# Patient Record
Sex: MALE | Race: OTHER | HISPANIC OR LATINO | Employment: FULL TIME | URBAN - METROPOLITAN AREA
[De-identification: names, ages, dates, MRNs, and addresses within clinical notes are randomized per-mention and may not be internally consistent; named-entity substitution may affect disease eponyms.]

---

## 2020-09-12 ENCOUNTER — APPOINTMENT (EMERGENCY)
Dept: RADIOLOGY | Facility: HOSPITAL | Age: 60
End: 2020-09-12
Payer: COMMERCIAL

## 2020-09-12 ENCOUNTER — HOSPITAL ENCOUNTER (OUTPATIENT)
Facility: HOSPITAL | Age: 60
Setting detail: OBSERVATION
Discharge: HOME | End: 2020-09-13
Attending: EMERGENCY MEDICINE | Admitting: INTERNAL MEDICINE
Payer: COMMERCIAL

## 2020-09-12 DIAGNOSIS — D64.9 ANEMIA: Primary | ICD-10-CM

## 2020-09-12 DIAGNOSIS — R77.8 ELEVATED TROPONIN: ICD-10-CM

## 2020-09-12 DIAGNOSIS — N30.01 ACUTE CYSTITIS WITH HEMATURIA: ICD-10-CM

## 2020-09-12 DIAGNOSIS — E87.1 HYPONATREMIA: ICD-10-CM

## 2020-09-12 DIAGNOSIS — E86.0 DEHYDRATION: ICD-10-CM

## 2020-09-12 DIAGNOSIS — I10 ESSENTIAL HYPERTENSION: ICD-10-CM

## 2020-09-12 PROCEDURE — 71045 X-RAY EXAM CHEST 1 VIEW: CPT

## 2020-09-12 PROCEDURE — 36415 COLL VENOUS BLD VENIPUNCTURE: CPT | Performed by: PHYSICIAN ASSISTANT

## 2020-09-12 PROCEDURE — 87651 STREP A DNA AMP PROBE: CPT | Performed by: PHYSICIAN ASSISTANT

## 2020-09-12 PROCEDURE — 87635 SARS-COV-2 COVID-19 AMP PRB: CPT | Performed by: INTERNAL MEDICINE

## 2020-09-12 PROCEDURE — 83690 ASSAY OF LIPASE: CPT | Performed by: PHYSICIAN ASSISTANT

## 2020-09-12 PROCEDURE — 99285 EMERGENCY DEPT VISIT HI MDM: CPT | Performed by: PHYSICIAN ASSISTANT

## 2020-09-12 PROCEDURE — 96360 HYDRATION IV INFUSION INIT: CPT

## 2020-09-12 PROCEDURE — 80053 COMPREHEN METABOLIC PANEL: CPT | Performed by: PHYSICIAN ASSISTANT

## 2020-09-12 PROCEDURE — 93005 ELECTROCARDIOGRAM TRACING: CPT

## 2020-09-12 PROCEDURE — 84484 ASSAY OF TROPONIN QUANT: CPT | Performed by: PHYSICIAN ASSISTANT

## 2020-09-12 PROCEDURE — 85025 COMPLETE CBC W/AUTO DIFF WBC: CPT | Performed by: PHYSICIAN ASSISTANT

## 2020-09-12 PROCEDURE — 99285 EMERGENCY DEPT VISIT HI MDM: CPT

## 2020-09-12 RX ADMIN — SODIUM CHLORIDE 1000 ML: 0.9 INJECTION, SOLUTION INTRAVENOUS at 23:50

## 2020-09-12 NOTE — LETTER
[address] [facility]  [address] [place] [id number] [phone]  Dept: [phone]    September 13, 2020     Patient: Kellen Schneider   YOB: 1960   Date of Visit: 9/12/2020       To Whom it May Concern:    Kellen Schneider is under my professional care  He was seen in the hospital from 9/12/2020   to 09/13/20  He may return to work on Sunday, September 20, 2020 without limitations  If you have any questions or concerns, please don't hesitate to call           Sincerely,          Gonzalez Dockery PA-C

## 2020-09-13 VITALS
TEMPERATURE: 98.9 F | SYSTOLIC BLOOD PRESSURE: 165 MMHG | HEART RATE: 86 BPM | RESPIRATION RATE: 20 BRPM | OXYGEN SATURATION: 99 % | DIASTOLIC BLOOD PRESSURE: 90 MMHG | BODY MASS INDEX: 42.06 KG/M2 | HEIGHT: 69 IN | WEIGHT: 283.95 LBS

## 2020-09-13 PROBLEM — I10 ESSENTIAL HYPERTENSION: Status: ACTIVE | Noted: 2020-09-13

## 2020-09-13 PROBLEM — E66.01 MORBID OBESITY WITH BMI OF 40.0-44.9, ADULT (HCC): Status: ACTIVE | Noted: 2020-09-13

## 2020-09-13 PROBLEM — R74.01 TRANSAMINITIS: Status: ACTIVE | Noted: 2020-09-13

## 2020-09-13 PROBLEM — R77.8 ELEVATED TROPONIN: Status: RESOLVED | Noted: 2020-09-13 | Resolved: 2020-09-13

## 2020-09-13 PROBLEM — E87.1 HYPONATREMIA: Status: ACTIVE | Noted: 2020-09-13

## 2020-09-13 PROBLEM — R77.8 ELEVATED TROPONIN: Status: ACTIVE | Noted: 2020-09-13

## 2020-09-13 PROBLEM — R06.83 SNORING: Status: ACTIVE | Noted: 2020-09-13

## 2020-09-13 PROBLEM — J02.9 ACUTE PHARYNGITIS, UNSPECIFIED: Status: RESOLVED | Noted: 2020-09-13 | Resolved: 2020-09-13

## 2020-09-13 PROBLEM — R73.9 HYPERGLYCEMIA: Status: ACTIVE | Noted: 2020-09-13

## 2020-09-13 PROBLEM — N17.9 ACUTE RENAL FAILURE (ARF) (HCC): Status: ACTIVE | Noted: 2020-09-13

## 2020-09-13 PROBLEM — N17.9 ACUTE RENAL FAILURE (ARF) (HCC): Status: RESOLVED | Noted: 2020-09-13 | Resolved: 2020-09-13

## 2020-09-13 PROBLEM — J02.9 ACUTE PHARYNGITIS, UNSPECIFIED: Status: ACTIVE | Noted: 2020-09-13

## 2020-09-13 PROBLEM — R31.29 MICROSCOPIC HEMATURIA: Status: ACTIVE | Noted: 2020-09-13

## 2020-09-13 PROBLEM — E11.9 NEWLY DIAGNOSED DIABETES (HCC): Status: ACTIVE | Noted: 2020-09-13

## 2020-09-13 LAB
ALBUMIN SERPL BCP-MCNC: 3.2 G/DL (ref 3–5.2)
ALBUMIN SERPL BCP-MCNC: 3.6 G/DL (ref 3–5.2)
ALP SERPL-CCNC: 52 U/L (ref 43–122)
ALP SERPL-CCNC: 54 U/L (ref 43–122)
ALT SERPL W P-5'-P-CCNC: 58 U/L (ref 9–52)
ALT SERPL W P-5'-P-CCNC: 63 U/L (ref 9–52)
ANION GAP SERPL CALCULATED.3IONS-SCNC: 6 MMOL/L (ref 5–14)
ANION GAP SERPL CALCULATED.3IONS-SCNC: 6 MMOL/L (ref 5–14)
ANION GAP SERPL CALCULATED.3IONS-SCNC: 7 MMOL/L (ref 5–14)
AST SERPL W P-5'-P-CCNC: 88 U/L (ref 17–59)
AST SERPL W P-5'-P-CCNC: 89 U/L (ref 17–59)
ATRIAL RATE: 87 BPM
BACTERIA UR QL AUTO: ABNORMAL /HPF
BASOPHILS # BLD AUTO: 0 THOUSANDS/ΜL (ref 0–0.1)
BASOPHILS NFR BLD AUTO: 0 % (ref 0–1)
BILIRUB SERPL-MCNC: 0.4 MG/DL
BILIRUB SERPL-MCNC: 0.4 MG/DL
BILIRUB UR QL STRIP: ABNORMAL
BUN SERPL-MCNC: 24 MG/DL (ref 5–25)
BUN SERPL-MCNC: 30 MG/DL (ref 5–25)
BUN SERPL-MCNC: 30 MG/DL (ref 5–25)
CALCIUM SERPL-MCNC: 8.3 MG/DL (ref 8.4–10.2)
CALCIUM SERPL-MCNC: 8.4 MG/DL (ref 8.4–10.2)
CALCIUM SERPL-MCNC: 8.8 MG/DL (ref 8.4–10.2)
CHLORIDE SERPL-SCNC: 94 MMOL/L (ref 97–108)
CHLORIDE SERPL-SCNC: 97 MMOL/L (ref 97–108)
CHLORIDE SERPL-SCNC: 97 MMOL/L (ref 97–108)
CK MB SERPL-MCNC: 6.5 NG/ML (ref 0–2.4)
CK MB SERPL-MCNC: <1 % (ref 0–2.5)
CK SERPL-CCNC: 803 U/L (ref 55–170)
CLARITY UR: ABNORMAL
CO2 SERPL-SCNC: 28 MMOL/L (ref 22–30)
CO2 SERPL-SCNC: 28 MMOL/L (ref 22–30)
CO2 SERPL-SCNC: 33 MMOL/L (ref 22–30)
COLOR UR: ABNORMAL
CREAT SERPL-MCNC: 1.09 MG/DL (ref 0.7–1.5)
CREAT SERPL-MCNC: 1.45 MG/DL (ref 0.7–1.5)
CREAT SERPL-MCNC: 2.17 MG/DL (ref 0.7–1.5)
EOSINOPHIL # BLD AUTO: 0 THOUSAND/ΜL (ref 0–0.4)
EOSINOPHIL NFR BLD AUTO: 0 % (ref 0–6)
ERYTHROCYTE [DISTWIDTH] IN BLOOD BY AUTOMATED COUNT: 14.8 %
ERYTHROCYTE [DISTWIDTH] IN BLOOD BY AUTOMATED COUNT: 15 %
EST. AVERAGE GLUCOSE BLD GHB EST-MCNC: 131 MG/DL
FERRITIN SERPL-MCNC: 430 NG/ML (ref 8–388)
FINE GRAN CASTS URNS QL MICRO: ABNORMAL /LPF
GFR SERPL CREATININE-BSD FRML MDRD: 32 ML/MIN/1.73SQ M
GFR SERPL CREATININE-BSD FRML MDRD: 52 ML/MIN/1.73SQ M
GFR SERPL CREATININE-BSD FRML MDRD: 73 ML/MIN/1.73SQ M
GLUCOSE SERPL-MCNC: 120 MG/DL (ref 70–99)
GLUCOSE SERPL-MCNC: 132 MG/DL (ref 70–99)
GLUCOSE SERPL-MCNC: 139 MG/DL (ref 70–99)
GLUCOSE UR STRIP-MCNC: NEGATIVE MG/DL
HBA1C MFR BLD: 6.2 %
HBV CORE AB SER QL: NORMAL
HBV CORE IGM SER QL: NORMAL
HBV SURFACE AG SER QL: NORMAL
HCT VFR BLD AUTO: 36.6 % (ref 41–53)
HCT VFR BLD AUTO: 37.6 % (ref 41–53)
HCV AB SER QL: NORMAL
HGB BLD-MCNC: 12.1 G/DL (ref 13.5–17.5)
HGB BLD-MCNC: 12.6 G/DL (ref 13.5–17.5)
HGB UR QL STRIP.AUTO: 250
HYALINE CASTS #/AREA URNS LPF: ABNORMAL /LPF
IRON SATN MFR SERPL: 12 %
IRON SERPL-MCNC: 22 UG/DL (ref 65–175)
KETONES UR STRIP-MCNC: ABNORMAL MG/DL
LEUKOCYTE ESTERASE UR QL STRIP: 25
LIPASE SERPL-CCNC: 124 U/L (ref 23–300)
LYMPHOCYTES # BLD AUTO: 1.2 THOUSANDS/ΜL (ref 0.5–4)
LYMPHOCYTES NFR BLD AUTO: 13 % (ref 25–45)
MAGNESIUM SERPL-MCNC: 2.2 MG/DL (ref 1.6–2.3)
MCH RBC QN AUTO: 29.1 PG (ref 26–34)
MCH RBC QN AUTO: 29.2 PG (ref 26–34)
MCHC RBC AUTO-ENTMCNC: 33.1 G/DL (ref 31–36)
MCHC RBC AUTO-ENTMCNC: 33.4 G/DL (ref 31–36)
MCV RBC AUTO: 87 FL (ref 80–100)
MCV RBC AUTO: 88 FL (ref 80–100)
MONOCYTES # BLD AUTO: 0.8 THOUSAND/ΜL (ref 0.2–0.9)
MONOCYTES NFR BLD AUTO: 9 % (ref 1–10)
NEUTROPHILS # BLD AUTO: 7 THOUSANDS/ΜL (ref 1.8–7.8)
NEUTS SEG NFR BLD AUTO: 77 % (ref 45–65)
NITRITE UR QL STRIP: NEGATIVE
NON-SQ EPI CELLS URNS QL MICRO: ABNORMAL /HPF
P AXIS: 53 DEGREES
PH UR STRIP.AUTO: 5 [PH]
PLATELET # BLD AUTO: 196 THOUSANDS/UL (ref 150–450)
PLATELET # BLD AUTO: 216 THOUSANDS/UL (ref 150–450)
PMV BLD AUTO: 10.1 FL (ref 8.9–12.7)
PMV BLD AUTO: 9.4 FL (ref 8.9–12.7)
POTASSIUM SERPL-SCNC: 3.7 MMOL/L (ref 3.6–5)
POTASSIUM SERPL-SCNC: 3.8 MMOL/L (ref 3.6–5)
POTASSIUM SERPL-SCNC: 4 MMOL/L (ref 3.6–5)
PR INTERVAL: 142 MS
PROT SERPL-MCNC: 6.7 G/DL (ref 5.9–8.4)
PROT SERPL-MCNC: 7.3 G/DL (ref 5.9–8.4)
PROT UR STRIP-MCNC: ABNORMAL MG/DL
QRS AXIS: -4 DEGREES
QRSD INTERVAL: 84 MS
QT INTERVAL: 356 MS
QTC INTERVAL: 428 MS
RBC # BLD AUTO: 4.16 MILLION/UL (ref 4.5–5.9)
RBC # BLD AUTO: 4.31 MILLION/UL (ref 4.5–5.9)
RBC #/AREA URNS AUTO: ABNORMAL /HPF
S PYO DNA THROAT QL NAA+PROBE: NORMAL
SARS-COV-2 RNA RESP QL NAA+PROBE: NEGATIVE
SODIUM SERPL-SCNC: 131 MMOL/L (ref 137–147)
SODIUM SERPL-SCNC: 131 MMOL/L (ref 137–147)
SODIUM SERPL-SCNC: 134 MMOL/L (ref 137–147)
SP GR UR STRIP.AUTO: 1.02 (ref 1–1.04)
T WAVE AXIS: 64 DEGREES
TIBC SERPL-MCNC: 189 UG/DL (ref 250–450)
TROPONIN I SERPL-MCNC: 0.03 NG/ML (ref 0–0.03)
TROPONIN I SERPL-MCNC: 0.04 NG/ML (ref 0–0.03)
TROPONIN I SERPL-MCNC: 0.05 NG/ML (ref 0–0.03)
TSH SERPL DL<=0.05 MIU/L-ACNC: 0.47 UIU/ML (ref 0.47–4.68)
UROBILINOGEN UA: 1 MG/DL
VENTRICULAR RATE: 87 BPM
WBC # BLD AUTO: 7.3 THOUSAND/UL (ref 4.5–11)
WBC # BLD AUTO: 9 THOUSAND/UL (ref 4.5–11)
WBC #/AREA URNS AUTO: ABNORMAL /HPF

## 2020-09-13 PROCEDURE — 81001 URINALYSIS AUTO W/SCOPE: CPT | Performed by: INTERNAL MEDICINE

## 2020-09-13 PROCEDURE — 83550 IRON BINDING TEST: CPT | Performed by: INTERNAL MEDICINE

## 2020-09-13 PROCEDURE — 93010 ELECTROCARDIOGRAM REPORT: CPT | Performed by: INTERNAL MEDICINE

## 2020-09-13 PROCEDURE — 84443 ASSAY THYROID STIM HORMONE: CPT | Performed by: INTERNAL MEDICINE

## 2020-09-13 PROCEDURE — 83735 ASSAY OF MAGNESIUM: CPT | Performed by: INTERNAL MEDICINE

## 2020-09-13 PROCEDURE — 86803 HEPATITIS C AB TEST: CPT | Performed by: INTERNAL MEDICINE

## 2020-09-13 PROCEDURE — 86705 HEP B CORE ANTIBODY IGM: CPT | Performed by: INTERNAL MEDICINE

## 2020-09-13 PROCEDURE — 99217 PR OBSERVATION CARE DISCHARGE MANAGEMENT: CPT | Performed by: PHYSICIAN ASSISTANT

## 2020-09-13 PROCEDURE — 99220 PR INITIAL OBSERVATION CARE/DAY 70 MINUTES: CPT | Performed by: INTERNAL MEDICINE

## 2020-09-13 PROCEDURE — 80053 COMPREHEN METABOLIC PANEL: CPT | Performed by: INTERNAL MEDICINE

## 2020-09-13 PROCEDURE — 80048 BASIC METABOLIC PNL TOTAL CA: CPT | Performed by: PHYSICIAN ASSISTANT

## 2020-09-13 PROCEDURE — 83540 ASSAY OF IRON: CPT | Performed by: INTERNAL MEDICINE

## 2020-09-13 PROCEDURE — 87340 HEPATITIS B SURFACE AG IA: CPT | Performed by: INTERNAL MEDICINE

## 2020-09-13 PROCEDURE — 83036 HEMOGLOBIN GLYCOSYLATED A1C: CPT | Performed by: INTERNAL MEDICINE

## 2020-09-13 PROCEDURE — 84484 ASSAY OF TROPONIN QUANT: CPT | Performed by: INTERNAL MEDICINE

## 2020-09-13 PROCEDURE — 82550 ASSAY OF CK (CPK): CPT | Performed by: INTERNAL MEDICINE

## 2020-09-13 PROCEDURE — 82553 CREATINE MB FRACTION: CPT | Performed by: INTERNAL MEDICINE

## 2020-09-13 PROCEDURE — 86704 HEP B CORE ANTIBODY TOTAL: CPT | Performed by: INTERNAL MEDICINE

## 2020-09-13 PROCEDURE — 82728 ASSAY OF FERRITIN: CPT | Performed by: INTERNAL MEDICINE

## 2020-09-13 PROCEDURE — 99024 POSTOP FOLLOW-UP VISIT: CPT | Performed by: PHYSICIAN ASSISTANT

## 2020-09-13 PROCEDURE — 81003 URINALYSIS AUTO W/O SCOPE: CPT | Performed by: INTERNAL MEDICINE

## 2020-09-13 PROCEDURE — 85027 COMPLETE CBC AUTOMATED: CPT | Performed by: INTERNAL MEDICINE

## 2020-09-13 RX ORDER — CIPROFLOXACIN 250 MG/1
500 TABLET, FILM COATED ORAL EVERY 12 HOURS SCHEDULED
Status: DISCONTINUED | OUTPATIENT
Start: 2020-09-13 | End: 2020-09-13 | Stop reason: HOSPADM

## 2020-09-13 RX ORDER — ONDANSETRON 2 MG/ML
4 INJECTION INTRAMUSCULAR; INTRAVENOUS EVERY 6 HOURS PRN
Status: DISCONTINUED | OUTPATIENT
Start: 2020-09-13 | End: 2020-09-13 | Stop reason: HOSPADM

## 2020-09-13 RX ORDER — HEPARIN SODIUM 5000 [USP'U]/ML
5000 INJECTION, SOLUTION INTRAVENOUS; SUBCUTANEOUS EVERY 8 HOURS SCHEDULED
Status: DISCONTINUED | OUTPATIENT
Start: 2020-09-13 | End: 2020-09-13 | Stop reason: HOSPADM

## 2020-09-13 RX ORDER — CIPROFLOXACIN 250 MG/1
250 TABLET, FILM COATED ORAL EVERY 12 HOURS SCHEDULED
Qty: 3 TABLET | Refills: 0 | Status: SHIPPED | OUTPATIENT
Start: 2020-09-13 | End: 2020-09-13 | Stop reason: HOSPADM

## 2020-09-13 RX ORDER — ASPIRIN 81 MG/1
324 TABLET, CHEWABLE ORAL ONCE
Status: COMPLETED | OUTPATIENT
Start: 2020-09-13 | End: 2020-09-13

## 2020-09-13 RX ORDER — CIPROFLOXACIN 500 MG/1
500 TABLET, FILM COATED ORAL EVERY 12 HOURS SCHEDULED
Qty: 5 TABLET | Refills: 0 | Status: SHIPPED | OUTPATIENT
Start: 2020-09-13 | End: 2020-09-16

## 2020-09-13 RX ORDER — TELMISARTAN 80 MG/1
80 TABLET ORAL DAILY
Status: ON HOLD | COMMUNITY
End: 2020-09-13 | Stop reason: SDUPTHER

## 2020-09-13 RX ORDER — CIPROFLOXACIN 500 MG/1
500 TABLET, FILM COATED ORAL EVERY 12 HOURS SCHEDULED
Qty: 2 TABLET | Refills: 0 | Status: SHIPPED | OUTPATIENT
Start: 2020-09-13 | End: 2020-09-13

## 2020-09-13 RX ORDER — SODIUM CHLORIDE, SODIUM GLUCONATE, SODIUM ACETATE, POTASSIUM CHLORIDE, MAGNESIUM CHLORIDE, SODIUM PHOSPHATE, DIBASIC, AND POTASSIUM PHOSPHATE .53; .5; .37; .037; .03; .012; .00082 G/100ML; G/100ML; G/100ML; G/100ML; G/100ML; G/100ML; G/100ML
100 INJECTION, SOLUTION INTRAVENOUS CONTINUOUS
Status: DISCONTINUED | OUTPATIENT
Start: 2020-09-13 | End: 2020-09-13

## 2020-09-13 RX ORDER — CIPROFLOXACIN 250 MG/1
250 TABLET, FILM COATED ORAL EVERY 12 HOURS SCHEDULED
Status: DISCONTINUED | OUTPATIENT
Start: 2020-09-13 | End: 2020-09-13

## 2020-09-13 RX ORDER — ACETAMINOPHEN 325 MG/1
650 TABLET ORAL EVERY 6 HOURS PRN
Status: DISCONTINUED | OUTPATIENT
Start: 2020-09-13 | End: 2020-09-13 | Stop reason: HOSPADM

## 2020-09-13 RX ORDER — SODIUM CHLORIDE 9 MG/ML
125 INJECTION, SOLUTION INTRAVENOUS CONTINUOUS
Status: DISCONTINUED | OUTPATIENT
Start: 2020-09-13 | End: 2020-09-13 | Stop reason: HOSPADM

## 2020-09-13 RX ORDER — TELMISARTAN 80 MG/1
80 TABLET ORAL DAILY
Refills: 0
Start: 2020-09-14

## 2020-09-13 RX ADMIN — ASPIRIN 81 MG CHEWABLE TABLET 324 MG: 81 TABLET CHEWABLE at 00:24

## 2020-09-13 RX ADMIN — HEPARIN SODIUM 5000 UNITS: 5000 INJECTION INTRAVENOUS; SUBCUTANEOUS at 05:03

## 2020-09-13 RX ADMIN — CIPROFLOXACIN HYDROCHLORIDE 500 MG: 250 TABLET, FILM COATED ORAL at 14:52

## 2020-09-13 RX ADMIN — SODIUM CHLORIDE 125 ML/HR: 0.9 INJECTION, SOLUTION INTRAVENOUS at 07:28

## 2020-09-13 RX ADMIN — SODIUM CHLORIDE, SODIUM GLUCONATE, SODIUM ACETATE, POTASSIUM CHLORIDE, MAGNESIUM CHLORIDE, SODIUM PHOSPHATE, DIBASIC, AND POTASSIUM PHOSPHATE 100 ML/HR: .53; .5; .37; .037; .03; .012; .00082 INJECTION, SOLUTION INTRAVENOUS at 02:10

## 2020-09-13 NOTE — PLAN OF CARE
Problem: PAIN - ADULT  Goal: Verbalizes/displays adequate comfort level or baseline comfort level  Description: Interventions:  - Encourage patient to monitor pain and request assistance  - Assess pain using appropriate pain scale  - Administer analgesics based on type and severity of pain and evaluate response  - Implement non-pharmacological measures as appropriate and evaluate response  - Consider cultural and social influences on pain and pain management  - Notify physician/advanced practitioner if interventions unsuccessful or patient reports new pain  Outcome: Progressing     Problem: SAFETY ADULT  Goal: Patient will remain free of falls  Description: INTERVENTIONS:  - Assess patient frequently for physical needs  -  Identify cognitive and physical deficits and behaviors that affect risk of falls    -  [place] fall precautions as indicated by assessment   - Educate patient/family on patient safety including physical limitations  - Instruct patient to call for assistance with activity based on assessment  - Modify environment to reduce risk of injury  - Consider OT/PT consult to assist with strengthening/mobility  Outcome: Progressing  Goal: Maintain or return to baseline ADL function  Description: INTERVENTIONS:  -  Assess patient's ability to carry out ADLs; assess patient's baseline for ADL function and identify physical deficits which impact ability to perform ADLs (bathing, care of mouth/teeth, toileting, grooming, dressing, etc )  - Assess/evaluate cause of self-care deficits   - Assess range of motion  - Assess patient's mobility; develop plan if impaired  - Assess patient's need for assistive devices and provide as appropriate  - Encourage maximum independence but intervene and supervise when necessary  - Involve family in performance of ADLs  - Assess for home care needs following discharge   - Consider OT consult to assist with ADL evaluation and planning for discharge  - Provide patient education as appropriate  Outcome: Progressing  Goal: Maintain or return mobility status to optimal level  Description: INTERVENTIONS:  - Assess patient's baseline mobility status (ambulation, transfers, stairs, etc )    - Identify cognitive and physical deficits and behaviors that affect mobility  - Identify mobility aids required to assist with transfers and/or ambulation (gait belt, sit-to-stand, lift, walker, cane, etc )  - [place] fall precautions as indicated by assessment  - Record patient progress and toleration of activity level on Mobility SBAR; progress patient to next Phase/Stage  - Instruct patient to call for assistance with activity based on assessment  - Consider rehabilitation consult to assist with strengthening/weightbearing, etc   Outcome: Progressing     Problem: DISCHARGE PLANNING  Goal: Discharge to home or other facility with appropriate resources  Description: INTERVENTIONS:  - Identify barriers to discharge w/patient and caregiver  - Arrange for needed discharge resources and transportation as appropriate  - Identify discharge learning needs (meds, wound care, etc )  - Arrange for interpretive services to assist at discharge as needed  - Refer to Case Management Department for coordinating discharge planning if the patient needs post-hospital services based on physician/advanced practitioner order or complex needs related to functional status, cognitive ability, or social support system  Outcome: Progressing     Problem: Knowledge Deficit  Goal: Patient/family/caregiver demonstrates understanding of disease process, treatment plan, medications, and discharge instructions  Description: Complete learning assessment and assess knowledge base    Interventions:  - Provide teaching at level of understanding  - Provide teaching via preferred learning methods  Outcome: Progressing     Problem: CARDIOVASCULAR - ADULT  Goal: Maintains optimal cardiac output and hemodynamic stability  Description: INTERVENTIONS:  - Monitor I/O, vital signs and rhythm  - Monitor for S/S and trends of decreased cardiac output  - Administer and titrate ordered vasoactive medications to optimize hemodynamic stability  - Assess quality of pulses, skin color and temperature  - Assess for signs of decreased coronary artery perfusion  - Instruct patient to report change in severity of symptoms  Outcome: Progressing  Goal: Absence of cardiac dysrhythmias or at baseline rhythm  Description: INTERVENTIONS:  - Continuous cardiac monitoring, vital signs, obtain 12 lead EKG if ordered  - Administer antiarrhythmic and heart rate control medications as ordered  - Monitor electrolytes and administer replacement therapy as ordered  Outcome: Progressing     Problem: RESPIRATORY - ADULT  Goal: Achieves optimal ventilation and oxygenation  Description: INTERVENTIONS:  - Assess for changes in respiratory status  - Assess for changes in mentation and behavior  - Position to facilitate oxygenation and minimize respiratory effort  - Oxygen administered by appropriate delivery if ordered  - Initiate smoking cessation education as indicated  - Encourage broncho-pulmonary hygiene including cough, deep breathe, Incentive Spirometry  - Assess the need for suctioning and aspirate as needed  - Assess and instruct to report SOB or any respiratory difficulty  - Respiratory Therapy support as indicated  Outcome: Progressing     Problem: GENITOURINARY - ADULT  Goal: Maintains or returns to baseline urinary function  Description: INTERVENTIONS:  - Assess urinary function  - Encourage oral fluids to ensure adequate hydration if ordered  - Administer IV fluids as ordered to ensure adequate hydration  - Administer ordered medications as needed  - Offer frequent toileting  - Follow urinary retention protocol if ordered  Outcome: Progressing  Goal: Absence of urinary retention  Description: INTERVENTIONS:  - Assess patients ability to void and empty bladder  - Monitor I/O  - Bladder scan as needed  - Discuss with physician/AP medications to alleviate retention as needed  - Discuss catheterization for long term situations as appropriate  Outcome: Progressing     Problem: METABOLIC, FLUID AND ELECTROLYTES - ADULT  Goal: Electrolytes maintained within normal limits  Description: INTERVENTIONS:  - Monitor labs and assess patient for signs and symptoms of electrolyte imbalances  - Administer electrolyte replacement as ordered  - Monitor response to electrolyte replacements, including repeat lab results as appropriate  - Instruct patient on fluid and nutrition as appropriate  Outcome: Progressing  Goal: Fluid balance maintained  Description: INTERVENTIONS:  - Monitor labs   - Monitor I/O and WT  - Instruct patient on fluid and nutrition as appropriate  - Assess for signs & symptoms of volume excess or deficit  Outcome: Progressing  Goal: Glucose maintained within target range  Description: INTERVENTIONS:  - Monitor Blood Glucose as ordered  - Assess for signs and symptoms of hyperglycemia and hypoglycemia  - Administer ordered medications to maintain glucose within target range  - Assess nutritional intake and initiate nutrition service referral as needed  Outcome: Progressing     Problem: HEMATOLOGIC - ADULT  Goal: Maintains hematologic stability  Description: INTERVENTIONS  - Assess for signs and symptoms of bleeding or hemorrhage  - Monitor labs  - Administer supportive blood products/factors as ordered and appropriate  Outcome: Progressing

## 2020-09-13 NOTE — ASSESSMENT & PLAN NOTE
· Glucose 139 on CMP; patient denies any personal history of diabetes  · Fasting sugar on morning labs: 120  · A1c: pending

## 2020-09-13 NOTE — ASSESSMENT & PLAN NOTE
· Patient reporting 3 day history of sore throat with postnasal drip  · Rapid strep A swab and COVID-19 PCR:  negative  · Suspect viral etiology  Continue supportive care

## 2020-09-13 NOTE — ASSESSMENT & PLAN NOTE
· Patient endorsing snoring at night, and daughter reporting apparent periods of apnea while patient sleeps  · Encouraged patient to discuss with PCP, may benefit from polysomnography as outpatient

## 2020-09-13 NOTE — ASSESSMENT & PLAN NOTE
· Patient reporting 3 day history of sore throat with postnasal drip    · Rapid strep A swab negative, and COVID-19 PCR  · Possibly viral in etiology, will provide supportive care, and monitor WBC/temperature curve/hemodynamics

## 2020-09-13 NOTE — ASSESSMENT & PLAN NOTE
· Glucose 139 on CMP; patient denies any personal history of diabetes  · Fasting sugar on morning labs: 120  · A1c: 6 2  · Offered patient lifestyle modification recommendations  Patient needs close follow up with his primary care doctor

## 2020-09-13 NOTE — ASSESSMENT & PLAN NOTE
· With mild elevations in AST (89) and ALT (63)  Patient denies history of alcohol abuse or known liver disease  Suspect fatty liver  · Chronic hepatitis panel: non-reactive  · Iron: 22, ferritin: 430, 12% sat

## 2020-09-13 NOTE — ASSESSMENT & PLAN NOTE
· With mild elevations in AST and ALT    Patient denies history of alcohol abuse or known liver disease  · Will obtain chronic hepatitis panel and check iron panel   · Monitor with CMP, and if persists may need to consider imaging

## 2020-09-13 NOTE — UTILIZATION REVIEW
Initial Clinical Review    Admission: Date/Time/Statement:   Admission Orders (From admission, onward)     Ordered        09/13/20 0052  Place in Observation  Once                   Orders Placed This Encounter   Procedures    Place in Observation     Standing Status:   Standing     Number of Occurrences:   1     Order Specific Question:   Admitting Physician     Answer:   Dylan Lau [[id number]]     Order Specific Question:   Level of Care     Answer:   Med Surg [[id number]]     Order Specific Question:   Bed request comments     Answer:   tele     ED Arrival Information     Expected Arrival Acuity Means of Arrival Escorted By Service Admission Type    - 9/12/2020 23:11 Urgent Walk-In Self General Medicine Urgent    Arrival Complaint     weakness        Chief Complaint   Patient presents with    Fatigue     pt states that he hasnt been feeling well since tuesday  pt states that he get checked twice a week at work for [employer] and has been negitive  pt states that he was last tested on sunday  pt states that he came here on friday [address]  pt states that he has been feeling tired, weak and having a sore throat and  SOB  pt denies any fevers, bodyaches, chest pain or HA  pt states that he took tylenol and some type of flue med around 6pm tonight      Assessment/Plan:   Mr Fredrick Bey is a 63 yo male who presents to the ED from home with c/o fatigue and sore throat with decreased oral intake and frequent napping  He has been feeling unwell since 9/8  He has been negative when checked for Covid  PMH: HTN, obesity, snoring,   In the ED he is afebrile and hemodynamically stable but has elevated creat 2 13, mild hyponatremia, transaminitis and troponin elevation of 0 05 with ECG NSR  He is treated with IV fluids  He is admitted to OBSERVATION status with ARF - IV fluids after bolus in ED, avoid nephrotoxins, check UA, bladder scan with PVR, follow labs with possible renal US and Nephrology consult    Elevated Troponin - possible type 2 NSTEMI r/t ARF - trend troponins, tele  Acute Pharyngitis - rapid strep negative  Transaminitis  - check hepatitis and iron panels  Hyperglycemia - glucose 139 - check A1C       9/13 will need to consider CT since contrast cannot be used r/t renal function  Na 131 will change fluids to NSS  Negative Covid  Sore throat resolved  Feel lightheaded and is hungry          ED Triage Vitals   Temperature Pulse Respirations Blood Pressure SpO2   09/12/20 2321 09/12/20 2321 09/12/20 2321 09/12/20 2321 09/12/20 2321   99 1 °F (37 3 °C) 92 18 151/70 94 %      Temp Source Heart Rate Source Patient Position - Orthostatic VS BP Location FiO2 (%)   09/12/20 2321 09/12/20 2321 09/13/20 0735 09/12/20 2321 --   Tympanic Monitor Lying Left arm       Pain Score       09/13/20 0136       No Pain          Wt Readings from Last 1 Encounters:   09/13/20 129 kg (283 lb 15 2 oz)     Additional Vital Signs:   09/13/20 0735   98 9 °F (37 2 °C)   86   20   165/90   99 %   None (Room air)   Lying    09/13/20 0136   99 5 °F (37 5 °C)   81   20   129/77   97 %   None (Room air)         Pertinent Labs/Diagnostic Test Results:     9/13 CXR - no acute disease    9/12 ECG - NSR    Results from last 7 days   Lab Units 09/12/20  2346   SARS-COV-2  Negative     Results from last 7 days   Lab Units 09/13/20  0520 09/12/20  2346   WBC Thousand/uL 7 30 9 00   HEMOGLOBIN g/dL 12 1* 12 6*   HEMATOCRIT % 36 6* 37 6*   PLATELETS Thousands/uL 196 216   NEUTROS ABS Thousands/µL  --  7 00         Results from last 7 days   Lab Units 09/13/20  0520 09/12/20  2346   SODIUM mmol/L 131* 134*   POTASSIUM mmol/L 3 7 4 0   CHLORIDE mmol/L 97 94*   CO2 mmol/L 28 33*   ANION GAP mmol/L 6 7   BUN mg/dL 30* 30*   CREATININE mg/dL 1 45 2 17*   EGFR ml/min/1 73sq m 52* 32*   CALCIUM mg/dL 8 3* 8 8   MAGNESIUM mg/dL 2 2  --      Results from last 7 days   Lab Units 09/13/20  0520 09/12/20  2346   AST U/L 89* 88*   ALT U/L 63* 58*   ALK PHOS U/L 52 54 TOTAL PROTEIN g/dL 6 7 7 3   ALBUMIN g/dL 3 2 3 6   TOTAL BILIRUBIN mg/dL 0 40 0 40     Results from last 7 days   Lab Units 09/13/20  0520 09/12/20  2346   GLUCOSE RANDOM mg/dL 120* 139*     Results from last 7 days   Lab Units 09/13/20  0520   CK TOTAL U/L 803*   CK MB INDEX % <1 0   CK MB ng/mL 6 5*     Results from last 7 days   Lab Units 09/13/20  0520 09/13/20  0222 09/12/20  2346   TROPONIN I ng/mL 0 03 0 04* 0 05*     Results from last 7 days   Lab Units 09/13/20  0520   TSH 3RD GENERATON uIU/mL 0 471         Results from last 7 days   Lab Units 09/12/20  2346   LIPASE u/L 124     Results from last 7 days   Lab Units 09/13/20  0238   CLARITY UA  Cloudy*   COLOR UA  Brown*   SPEC GRAV UA  1 020   PH UA  5 0   GLUCOSE UA mg/dl Negative   KETONES UA mg/dl 5 (Trace)*   BLOOD UA  250 0*   PROTEIN UA mg/dl 100 (2+)*   NITRITE UA  Negative   BILIRUBIN UA  1 mg/dL*   UROBILINOGEN UA mg/dL 1 0   LEUKOCYTES UA  25 0*   WBC UA /hpf 2-4*   RBC UA /hpf 4-10*   BACTERIA UA /hpf Innumerable*   EPITHELIAL CELLS WET PREP /hpf None Seen     ED Treatment:   Medication Administration from 09/12/2020 2311 to 09/13/2020 0127    Date/Time Order Dose Route Action   09/12/2020 2350 sodium chloride 0 9 % bolus 1,000 mL 1,000 mL Intravenous New Bag   09/13/2020 0024 aspirin chewable tablet 324 mg 324 mg Oral Given        Past Medical History:   Diagnosis Date    Hypertension      Present on Admission:   Acute renal failure (ARF) (HCC)   (Resolved) Elevated troponin   Essential hypertension   Hyperglycemia   Transaminitis   Acute pharyngitis, unspecified   Snoring   Microscopic hematuria    Admitting Diagnosis: Dehydration [E86 0]  Hyponatremia [E87 1]  Fatigue [R53 83]  Anemia [D64 9]  Elevated troponin [R79 89]     Age/Sex: 62 y o  male     Admission Orders:    Scheduled Medications:  heparin (porcine), 5,000 Units, Subcutaneous, Q8H [address]      Continuous IV Infusions:  sodium chloride, 125 mL/hr, Intravenous, Continuous      PRN Meds:  acetaminophen, 650 mg, Oral, Q6H PRN  ondansetron, 4 mg, Intravenous, Q6H PRN    SCDs  OOB as joey   Chronic hepatitis panel   Iron panel  Cardiac diet     Network Utilization Review Department  [email or web address]  ATTENTION: Please call with any questions or concerns to [phone] and carefully listen to the prompts so that you are directed to the right person  All voicemails are confidential   Sherrie Bee all requests for admission clinical reviews, approved or denied determinations and any other requests to dedicated fax number below belonging to the campus where the patient is receiving treatment   List of dedicated fax numbers for the Facilities:  [address] DENIALS (Administrative/Medical Necessity) [phone]   [address] (Maternity/NICU/Pediatrics) [phone]   Angelo Hancock [phone]   Misti Nunez [phone]   Nain Clark [phone]   145 [facility]  [phone]   [address] [phone]   [facility]  [phone]   [address] [facility] [address]  2401 [facility] 1000 [address] [facility] [phone]

## 2020-09-13 NOTE — PROGRESS NOTES
Progress Note - Yvonne Rm 1960, 62 y o  male MRN: [id number]  Unit/Bed#: 7T [facility] 705-02 Encounter: [id number]  Primary Care Provider: No primary care provider on file  Date and time admitted to hospital: 9/12/2020 11:16 PM    This is a post-admit follow up  Utilized  #: [id number] (Tomas Tovar) for translation  * Acute renal failure (ARF) ([facility] [address] )  Assessment & Plan  · POA and suspected, baseline is not available at time of admission  · Patient does endorse poor oral intake for the past 2-3 days due to sore throat and generalized malaise  · Current creatinine: 1 45  · UA demonstrates leukocytes, protein, ketones, bilirubin and blood  · Patient should seek out-patient urology evaluation for microscopic hematuria  · Holding home telmisartan and will avoid other nephrotoxins  · Avoid hypotension  · Continue IVFs for now  · Consider abdominal ultrasound  CT cannot be completed with contrast given patient's renal function  Hyponatremia  Assessment & Plan  · Sodium today: 131   · Change IVF to NS    Microscopic hematuria  Assessment & Plan  · Out-patient urology evaluation  Morbid obesity with BMI of 40 0-44 9, adult (HCC)  Assessment & Plan  · Dietary and lifestyle modification discussed    Snoring  Assessment & Plan  · Patient endorsing snoring at night, and daughter reporting apparent periods of apnea while patient sleeps  · Encouraged patient to discuss with PCP, may benefit from polysomnography as outpatient    Acute pharyngitis, unspecified  Assessment & Plan  · Patient reporting 3 day history of sore throat with postnasal drip  · Rapid strep A swab and COVID-19 PCR:  negative  · Suspect viral etiology  Continue supportive care  Transaminitis  Assessment & Plan  · With mild elevations in AST (89) and ALT (63)    Patient denies history of alcohol abuse or known liver disease  · Chronic hepatitis panel: pending  · Iron panel: pending  · Consider abdominal ultrasound  Hyperglycemia  Assessment & Plan  · Glucose 139 on CMP; patient denies any personal history of diabetes  · Fasting sugar on morning labs: 120  · A1c: pending    Essential hypertension  Assessment & Plan  · On telmisartan 80 mg daily at home, however holding in setting of suspected acute renal failure  · Blood pressures are elevated  Will use PRN metoprolol for SBP >170 or HR >100    Elevated troponinresolved as of 2020  Assessment & Plan  · Troponin 0 05 at time of admission  The patient denies any chest pain/pressure or other anginal equivalents, and EKG normal sinus rhythm  · Repeat troponins have all been negative  No ACS  VTE Pharmacologic Prophylaxis:   Pharmacologic: Heparin  Mechanical: Mechanical VTE prophylaxis in place  Patient Centered Rounds: I have performed bedside rounds with nursing staff today  Discussions with Specialists or Other Care Team Provider: None  Education and Discussions with Family / Patient: Patient's wife is at the bedside  Used  #: [id number] Indira Anton)  All questions answered to the best of my ability  Time Spent for Care: 20 minutes  More than 50% of total time spent on counseling and coordination of care as described above  Current Length of Stay: 0 day(s)  Current Patient Status: Observation   Certification Statement: Continue observation status for now  Possible discharge home later today unless renal function worsens  Discharge Plan: Patient is not yet medically stable for discharge  Will check renal function and electrolytes later today to determine if patient can be discharges  Code Status: Level 1 - Full Code    Subjective:   Patient was interviewed utilizing a   Patient reports his sore throat has resolved  He feels a little lightheaded but is contributing that to being hungry despite already eating breakfast   He denies any CP or SOB        Objective:   Vitals:   Temp (24hrs), Av 2 °F (37 3 °C), Min:98 9 °F (37 2 °C), Max:99 5 °F (37 5 °C)    Temp:  [98 9 °F (37 2 °C)-99 5 °F (37 5 °C)] 98 9 °F (37 2 °C)  HR:  [81-92] 86  Resp:  [18-20] 20  BP: (129-165)/(70-90) 165/90  SpO2:  [94 %-99 %] 99 %  Body mass index is 41 93 kg/m²  Input and Output Summary (last 24 hours): Intake/Output Summary (Last 24 hours) at 9/13/2020 1009  Last data filed at 9/13/2020 0900  Gross per 24 hour   Intake 360 ml   Output 500 ml   Net -140 ml       Physical Exam:     Physical Exam  Vitals signs reviewed  HENT:      Head: Normocephalic and atraumatic  Eyes:      General: No scleral icterus  Cardiovascular:      Rate and Rhythm: Normal rate and regular rhythm  Heart sounds: No murmur  Pulmonary:      Breath sounds: Normal breath sounds  No wheezing or rales  Chest:      Chest wall: No tenderness  Abdominal:      General: Bowel sounds are normal  There is no distension  Palpations: Abdomen is soft  Tenderness: There is no abdominal tenderness  Musculoskeletal: Normal range of motion  Skin:     General: Skin is warm and dry  Findings: No rash  Additional Data:   Labs:  Results from last 7 days   Lab Units 09/13/20  0520 09/12/20  2346   WBC Thousand/uL 7 30 9 00   HEMOGLOBIN g/dL 12 1* 12 6*   HEMATOCRIT % 36 6* 37 6*   PLATELETS Thousands/uL 196 216   NEUTROS PCT %  --  77*   LYMPHS PCT %  --  13*   MONOS PCT %  --  9   EOS PCT %  --  0     Results from last 7 days   Lab Units 09/13/20  0520   POTASSIUM mmol/L 3 7   CHLORIDE mmol/L 97   CO2 mmol/L 28   BUN mg/dL 30*   CREATININE mg/dL 1 45   CALCIUM mg/dL 8 3*   ALK PHOS U/L 52   ALT U/L 63*   AST U/L 89*     * I Have Reviewed All Lab Data Listed Above  * Additional Pertinent Lab Tests Reviewed:  All Labs Within Last 24 Hours Reviewed    Imaging:  Imaging Reports Reviewed Today Include: None new    Cultures:   Blood Culture: No results found for: BLOODCX  Urine Culture: No results found for: URINECX  Sputum Culture: No components found for: SPUTUMCX  Wound Culture: No results found for: WOUNDCULT    Last 24 Hours Medication List:   Current Facility-Administered Medications   Medication Dose Route Frequency Provider Last Rate    acetaminophen  650 mg Oral Q6H PRN Migdalia Mace, DO      heparin (porcine)  5,000 Units Subcutaneous [facility] Migdalia Mace, DO      ondansetron  4 mg Intravenous Q6H PRN Migdalia Mace, DO      sodium chloride  125 mL/hr Intravenous Continuous Senthil Riley  mL/hr (09/13/20 1383)        Today, Patient Was Seen By: Romario Pike PA-C    ** Please Note: Dragon 360 Dictation voice to text software may have been used in the creation of this document   **

## 2020-09-13 NOTE — ED PROVIDER NOTES
History  Chief Complaint   Patient presents with    Fatigue     pt states that he hasnt been feeling well since tuesday  pt states that he get checked twice a week at work for [employer] and has been negitive  pt states that he was last tested on sunday  pt states that he came here on friday [address]  pt states that he has been feeling tired, weak and having a sore throat and  SOB  pt denies any fevers, bodyaches, chest pain or HA  pt states that he took tylenol and some type of flue med around 6pm tonight      61 yo M with PMH HTN presenting for evaluation of generalized weakness  Pt reports for 4 days he has had generalized weakness, sore throat and intermittent sob  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 twice weekly  Pt reports he has tested negative with last test being about one week ago  He is brought in by daughter today stating that she became worried as pt was very tired today  He took multiple naps today and did not want to eat or drink as much as usual  Pt denies any cough, cp, abdominal pain, headache, n/v/d  No fevers, chills or sweats  Pt did take tylenol and nyquil earlier today for symptoms  None       Past Medical History:   Diagnosis Date    Hypertension        History reviewed  No pertinent surgical history  History reviewed  No pertinent family history  I have reviewed and agree with the history as documented  E-Cigarette/Vaping    E-Cigarette Use Never User      E-Cigarette/Vaping Substances     Social History     Tobacco Use    Smoking status: Never Smoker    Smokeless tobacco: Never Used   Substance Use Topics    Alcohol use: Never     Frequency: Never    Drug use: Never       Review of Systems   All other systems reviewed and are negative  Physical Exam  Physical Exam  Vitals signs and nursing note reviewed  Constitutional:       General: He is not in acute distress  Appearance: Normal appearance  He is well-developed  He is obese   He is not ill-appearing or diaphoretic  HENT:      Head: Normocephalic and atraumatic  Right Ear: Ear canal normal       Left Ear: Ear canal normal       Mouth/Throat:      Mouth: Mucous membranes are moist       Pharynx: Posterior oropharyngeal erythema present  No oropharyngeal exudate  Eyes:      Conjunctiva/sclera: Conjunctivae normal       Comments: EOM grossly intact   Neck:      Musculoskeletal: Normal range of motion and neck supple  Vascular: No JVD  Cardiovascular:      Rate and Rhythm: Normal rate  Heart sounds: No murmur  No friction rub  No gallop  Pulmonary:      Effort: Pulmonary effort is normal  No respiratory distress  Breath sounds: No stridor  No wheezing  Abdominal:      General: There is no distension  Palpations: Abdomen is soft  Tenderness: There is no abdominal tenderness  Musculoskeletal:      Comments: FROM, steady gait, cap refill brisk, strength and sensation grossly intact throughout   Skin:     General: Skin is warm and dry  Capillary Refill: Capillary refill takes less than 2 seconds  Neurological:      General: No focal deficit present  Mental Status: He is alert and oriented to person, place, and time  Motor: No weakness     Psychiatric:         Behavior: Behavior normal          Vital Signs  ED Triage Vitals [09/12/20 2321]   Temperature Pulse Respirations Blood Pressure SpO2   99 1 °F (37 3 °C) 92 18 151/70 94 %      Temp Source Heart Rate Source Patient Position - Orthostatic VS BP Location FiO2 (%)   Tympanic Monitor -- Left arm --      Pain Score       --           Vitals:    09/12/20 2321   BP: 151/70   Pulse: 92         Visual Acuity      ED Medications  Medications   sodium chloride 0 9 % bolus 1,000 mL (1,000 mL Intravenous New Bag 9/12/20 2350)   aspirin chewable tablet 324 mg (324 mg Oral Given 9/13/20 0024)       Diagnostic Studies  Results Reviewed     Procedure Component Value Units Date/Time    Strep A PCR [[id number]] (Normal) Collected:  09/12/20 2346    Lab Status:  Final result Specimen:  Throat Updated:  09/13/20 0020     STREP A PCR None Detected    Troponin I [[id number]]  (Abnormal) Collected:  09/12/20 2346    Lab Status:  Final result Specimen:  Blood from Arm, Right Updated:  09/13/20 0019     Troponin I 0 05 ng/mL     Comprehensive metabolic panel [[id number]]  (Abnormal) Collected:  09/12/20 2346    Lab Status:  Final result Specimen:  Blood from Arm, Right Updated:  09/13/20 0006     Sodium 134 mmol/L      Potassium 4 0 mmol/L      Chloride 94 mmol/L      CO2 33 mmol/L      ANION GAP 7 mmol/L      BUN 30 mg/dL      Creatinine 2 17 mg/dL      Glucose 139 mg/dL      Calcium 8 8 mg/dL      AST 88 U/L      ALT 58 U/L      Alkaline Phosphatase 54 U/L      Total Protein 7 3 g/dL      Albumin 3 6 g/dL      Total Bilirubin 0 40 mg/dL      eGFR 32 ml/min/1 73sq m     Narrative:       National Kidney Disease Foundation guidelines for Chronic Kidney Disease (CKD):     Stage 1 with normal or high GFR (GFR > 90 mL/min/1 73 square meters)    Stage 2 Mild CKD (GFR = 60-89 mL/min/1 73 square meters)    Stage 3A Moderate CKD (GFR = 45-59 mL/min/1 73 square meters)    Stage 3B Moderate CKD (GFR = 30-44 mL/min/1 73 square meters)    Stage 4 Severe CKD (GFR = 15-29 mL/min/1 73 square meters)    Stage 5 End Stage CKD (GFR <15 mL/min/1 73 square meters)  Note: GFR calculation is accurate only with a steady state creatinine    CBC and differential [[id number]]  (Abnormal) Collected:  09/12/20 2346    Lab Status:  Final result Specimen:  Blood from Arm, Right Updated:  09/13/20 0006     WBC 9 00 Thousand/uL      RBC 4 31 Million/uL      Hemoglobin 12 6 g/dL      Hematocrit 37 6 %      MCV 87 fL      MCH 29 2 pg      MCHC 33 4 g/dL      RDW 14 8 %      MPV 9 4 fL      Platelets 845 Thousands/uL      Neutrophils Relative 77 %      Lymphocytes Relative 13 %      Monocytes Relative 9 %      Eosinophils Relative 0 %      Basophils Relative 0 %      Neutrophils Absolute 7 00 Thousands/µL      Lymphocytes Absolute 1 20 Thousands/µL      Monocytes Absolute 0 80 Thousand/µL      Eosinophils Absolute 0 00 Thousand/µL      Basophils Absolute 0 00 Thousands/µL     Lipase [[id number]]  (Normal) Collected:  09/12/20 2346    Lab Status:  Final result Specimen:  Blood from Arm, Right Updated:  09/13/20 0006     Lipase 124 u/L     Novel Coronavirus (COVID-19), PCR [facility] [[id number]] Collected:  09/12/20 2346    Lab Status: In process Specimen:  Nares from Nose Updated:  09/12/20 2355    UA w Reflex to Microscopic w Reflex to Culture [[id number]]     Lab Status:  No result Specimen:  Urine, Other                  XR chest 1 view portable    (Results Pending)              Procedures  ECG 12 Lead Documentation Only    Date/Time: 9/12/2020 11:51 PM  Performed by: Matt Carney PA-C  Authorized by: Matt Carney PA-C     Indications / Diagnosis:  Weakness  ECG reviewed by me, the ED Provider: yes    Patient location:  ED  Interpretation:     Interpretation: normal    Rate:     ECG rate:  87    ECG rate assessment: normal    Rhythm:     Rhythm: sinus rhythm    Ectopy:     Ectopy: none    QRS:     QRS axis:  Normal  Conduction:     Conduction: normal    ST segments:     ST segments:  Normal  T waves:     T waves: normal    Comments:                   ED Course  ED Course as of Sep 13 0104   Sun Sep 13, 2020   0017 ? LAY, no previous labs to compare to but pt refuses having any hx of kidney problems in the past   Creatinine(!): 2 17   0017 Spoke with pt regarding admission for dehydration but he is stating that he has to work tomorrow and would like to go back to [place] and see a doctor there, will await rest of labs and dispo      0021 Likely secondary to increased kidney functions   Troponin I(!): 0 05   0033 STREP A PCR: None Detected   0042 Pt now changing his mind and would like to stay, will contact [facility]                              SBIRT 22yo+      Most Recent Value   SBIRT (22 yo +)   In order to provide better care to our patients, we are screening all of our patients for alcohol and drug use  Would it be okay to ask you these screening questions? Yes Filed at: 09/12/2020 2326   Initial Alcohol Screen: US AUDIT-C    1  How often do you have a drink containing alcohol?  0 Filed at: 09/12/2020 2326   2  How many drinks containing alcohol do you have on a typical day you are drinking? 0 Filed at: 09/12/2020 2326   3a  Male UNDER 65: How often do you have five or more drinks on one occasion? 0 Filed at: 09/12/2020 2326   Audit-C Score  0 Filed at: 09/12/2020 2326   CRISTIAN: How many times in the past year have you    Used an illegal drug or used a prescription medication for non-medical reasons? Never Filed at: 09/12/2020 2326                  MDM  Number of Diagnoses or Management Options  Anemia:   Dehydration:   Elevated troponin:   Hyponatremia:   Diagnosis management comments: 67-year-old male presenting for evaluation of 3 days of fatigue and generalized malaise, pt from [place] so we do not have record of previous labs but he has elevated bun and cr, will admit for IVF and hydration, pt found to have elevated trop likely secondary to LAY, no cp, will give asa, will not start nstemi pathway, spoke with SLIM who will admit    Portions of the record may have been created with voice recognition software  Occasional wrong word or "sound a like" substitutions may have occurred due to the inherent limitations of voice recognition software  Read the chart carefully and recognize, using context, where substitutions have occurred          Disposition  Final diagnoses:   Anemia   Hyponatremia   Dehydration   Elevated troponin     Time reflects when diagnosis was documented in both MDM as applicable and the Disposition within this note     Time User Action Codes Description Comment    9/13/2020 12:08 AM Jeff Baird Add [D64 9] Anemia     9/13/2020 12:08 AM Nolia  C Add [E87 1] Hyponatremia     9/13/2020 12:21 AM Nolia  C Add [E86 0] Dehydration     9/13/2020 12:21 AM Nolia  C Add [R79 89] Elevated troponin     9/13/2020 12:32 AM Nolia  C Add [N17 0] Acute kidney injury (LAY) with acute tubular necrosis (ATN) ([facility] )     9/13/2020 12:32 AM Nolia  C Remove [N17 0] Acute kidney injury (LAY) with acute tubular necrosis (ATN) ([facility] )     9/13/2020 12:33 AM Nolia  C Add [N17 9] LAY (acute kidney injury) ([facility] )     9/13/2020 12:51 AM Nolia  C Remove [N17 9] LAY (acute kidney injury) [facility])       ED Disposition     ED Disposition Condition Date/Time Comment    Admit Stable Sun Sep 13, 2020 12:51 AM Case was discussed with BROCK and the patient's admission status was agreed to be Admission Status: observation status to the service of Dr Hola Samayoa  Follow-up Information    None         Patient's Medications    No medications on file     No discharge procedures on file      PDMP Review     None          ED Provider  Electronically Signed by           Cory Albarado PA-C  09/13/20 0104

## 2020-09-13 NOTE — ASSESSMENT & PLAN NOTE
· Patient reporting 3 day history of sore throat with postnasal drip  Now resolved  · Rapid strep A swab and COVID-19 PCR:  negative  · Suspect viral etiology  Continue supportive care  Normal

## 2020-09-13 NOTE — DISCHARGE SUMMARY
Discharge- Kelvin Eagle 1960, 62 y o  male MRN: [id number]  Unit/Bed#: 7T [facility] 705-02 Encounter: [id number]  Primary Care Provider: No primary care provider on file  Date and time admitted to hospital: 9/12/2020 11:16 PM    * Acute renal failure (ARF) (HCC)resolved as of 9/13/2020  Assessment & Plan  · POA (creatinine: 2 17) and suspected, baseline is not available at time of admission  · Patient does endorse poor oral intake for the past 2-3 days due to sore throat and generalized malaise  · Current creatinine: 1 09  · UA demonstrates leukocytes, protein, ketones, bilirubin and blood  · Patient should seek out-patient urology evaluation for microscopic hematuria  · Holding home telmisartan and will avoid other nephrotoxins  Resume telmisartan tomorrow  · Consider abdominal ultrasound as an out-patient  Hyponatremia  Assessment & Plan  · Sodium today: 131   · Will need repeat BMP in 1 week  Microscopic hematuria  Assessment & Plan  · Out-patient urology evaluation  Morbid obesity with BMI of 40 0-44 9, adult ([facility] )  Assessment & Plan  · Dietary and lifestyle modification discussed    Snoring  Assessment & Plan  · Patient endorsing snoring at night, and daughter reporting apparent periods of apnea while patient sleeps  · Encouraged patient to discuss with PCP, may benefit from polysomnography as outpatient    Transaminitis  Assessment & Plan  · With mild elevations in AST (89) and ALT (63)  Patient denies history of alcohol abuse or known liver disease  Suspect fatty liver  · Chronic hepatitis panel: non-reactive  · Iron: 22, ferritin: 430, 12% sat  Newly diagnosed diabetes ([facility] )  Assessment & Plan  · Glucose 139 on CMP; patient denies any personal history of diabetes  · Fasting sugar on morning labs: 120  · A1c: 6 2  · Offered patient lifestyle modification recommendations  Patient needs close follow up with his primary care doctor      Essential hypertension  Assessment & Plan  · On telmisartan 80 mg daily at home, however holding in setting of suspected acute renal failure  · Blood pressures are elevated but will restart telmisartan tomorrow  Acute pharyngitis, unspecifiedresolved as of 9/13/2020  Assessment & Plan  · Patient reporting 3 day history of sore throat with postnasal drip  Now resolved  · Rapid strep A swab and COVID-19 PCR:  negative  · Suspect viral etiology  Continue supportive care  Elevated troponinresolved as of 9/13/2020  Assessment & Plan  · Troponin 0 05 at time of admission  The patient denies any chest pain/pressure or other anginal equivalents, and EKG normal sinus rhythm  · Repeat troponins have all been negative  No ACS  Discharging Physician / Practitioner: Regulo Castro PA-C  PCP: No primary care provider on file  Admission Date:   Admission Orders (From admission, onward)     Ordered        09/13/20 0052  Place in Observation  Once                   Discharge Date: 09/13/20    Resolved Problems  Date Reviewed: 9/13/2020          Resolved    * (Principal) Acute renal failure (ARF) ([facility] ) 9/13/2020     Resolved by  Regulo Castro PA-C    Elevated troponin 9/13/2020     Resolved by  Regulo Castro PA-C    Acute pharyngitis, unspecified 9/13/2020     Resolved by  Regulo Castro PA-C        Consultations During Hospital Stay:  · None    Procedures Performed:   · Chest xray (09/13/2020):  No acute cardiopulmonary disease  Significant Findings / Test Results:   · None    Incidental Findings:   · None     Test Results Pending at Discharge (will require follow up): · None     Outpatient Tests Requested:  · None    Complications:  None    Reason for Admission:  Fatigue and sore throat    Hospital Course:     Lita Runner is a 62 y o  male patient who originally presented to the hospital on 9/12/2020 due to fatigue and sore throat    The patient currently resides in [place] but was here visiting his daughter when he developed symptoms of generalized weakness and fatigue  He then developed sore throat and had decreased oral intake  When he presented to the emergency department for evaluation he was found to have an elevated creatinine of 2 13  However, given he is out of state we were unsure of what his baseline was  The patient only has a history of hypertension and is unaware of any kidney problems  Therefore, he was admitted under observation for LAY  His ARB was held during hospitalization and can be resumed a day or 2 after discharge  He was treated with IV fluids and all his symptoms resolved  He was found to be hyponatremic and was discharged with a sodium level 131 which appeared to be stable despite IV fluid  He was instructed to follow-up with his primary care doctor back in [place] in regards to this  The patient was then medically cleared for discharge  Please see above list of diagnoses and related plan for additional information  Condition at Discharge: stable     Discharge Day Visit / Exam:     * Please refer to separate progress note for these details *    Discussion with Family: Wife and daughter    Discharge instructions/Information to patient and family:   See after visit summary for information provided to patient and family  Provisions for Follow-Up Care:  See after visit summary for information related to follow-up care and any pertinent home health orders  Disposition:     Home    For Discharges to [facility] SNF:   · Not Applicable to this Patient - Not Applicable to this Patient    Planned Readmission: No     Discharge Statement:  I spent 45 minutes discharging the patient  This time was spent on the day of discharge  I had direct contact with the patient on the day of discharge  Greater than 50% of the total time was spent examining patient, answering all patient questions, arranging and discussing plan of care with patient as well as directly providing post-discharge instructions  Additional time then spent on discharge activities  Discharge Medications:  See after visit summary for reconciled discharge medications provided to patient and family        ** Please Note: This note has been constructed using a voice recognition system **

## 2020-09-13 NOTE — ASSESSMENT & PLAN NOTE
· POA and suspected, baseline is not available at time of admission  · Patient does endorse poor oral intake for the past 2-3 days due to sore throat and generalized malaise  · Current creatinine: 1 45  · UA demonstrates leukocytes, protein, ketones, bilirubin and blood  · Patient should seek out-patient urology evaluation for microscopic hematuria  · Holding home telmisartan and will avoid other nephrotoxins  · Avoid hypotension  · Continue IVFs for now  · Consider abdominal ultrasound  CT cannot be completed with contrast given patient's renal function

## 2020-09-13 NOTE — ASSESSMENT & PLAN NOTE
· Glucose 139 on CMP; patient denies any personal history of diabetes  · Will obtain A1c for further screening

## 2020-09-13 NOTE — ASSESSMENT & PLAN NOTE
· POA and suspected, baseline is not available at time of admission  · Patient does endorse poor oral intake for the past 2-3 days due to sore throat and generalized malaise  · Received 1L normal saline in ED, will provide Isolyte @ 100cc/hr overnight  · Obtain UA, will measure bladder scan and PVR  · Holding home telmisartan and will avoid other nephrotoxins  · Avoid hypotension  · Will repeat CMP in a m    If no significant improvement will need to consider further testing and possible renal ultrasound, and consider Nephrology consultation

## 2020-09-13 NOTE — ASSESSMENT & PLAN NOTE
· On telmisartan 80 mg daily at home, however holding in setting of suspected acute renal failure  · Blood pressures are elevated    Will use PRN metoprolol for SBP >170 or HR >100

## 2020-09-13 NOTE — ASSESSMENT & PLAN NOTE
· On telmisartan 80 mg daily at home, however holding in setting of suspected acute renal failure  · Blood pressure currently acceptable and will continue to monitor as per protocol

## 2020-09-13 NOTE — ASSESSMENT & PLAN NOTE
· Troponin 0 05 at time of admission  The patient denies any chest pain/pressure or other anginal equivalents, and EKG normal sinus rhythm  · Repeat troponins have all been negative  No ACS

## 2020-09-13 NOTE — ASSESSMENT & PLAN NOTE
· With mild elevations in AST (89) and ALT (63)  Patient denies history of alcohol abuse or known liver disease  · Chronic hepatitis panel: pending  · Iron panel: pending  · Consider abdominal ultrasound

## 2020-09-13 NOTE — DISCHARGE INSTRUCTIONS
La prediabetes   LO QUE NECESITA SABER:   La prediabetes es un nivel de glucosa en la terell que es más alto de lo normal  No es lo suficientemente alto para ser considerado diabetes  La prediabetes aumenta el riesgo de diabetes tipo 2 y enfermedad del corazón  INSTRUCCIONES SOBRE EL ESA HOSPITALARIA:   Pregúntele a redmond Reyne Rave vitaminas y minerales son adecuados para usted  · Usted tiene más hambre o sed de lo usual      · Usted está orinando con más frecuencia de lo normal      · Usted pierde peso sin proponérselo  · Usted tiene visión borrosa  · Usted tiene preguntas o inquietudes acerca de redmond condición o cuidado  Medicamentos,  podrían administrarse para controlar el nivel de azúcar en la terell  También podrían administrarle medicamentos para bajar la presión arterial esa y el colesterol alto  Acuda a lizzette consultas de control con redmond médico según le indicaron  Usted necesitará regresar cada año para que le realicen pruebas de diabetes  Anote lizzette preguntas para que se acuerde de hacerlas antonio lizzette visitas  Controle la prediabetes:  La pérdida de peso y el ejercicio funcionan mejor para retrasar o prevenir la diabetes tipo 2  Puede disminuir el riesgo de padecer diabetes tipo 2 realizando lo siguiente:  · Baje de peso si usted tiene sobrepeso  Nhung pérdida de peso del 7% de redmond peso corporal puede ayudar a reducir el nivel de azúcar en redmond terell  Por ejemplo, si usted pesa 200 libras, usted debería perder 14 libras  · Realice actividad física con regularidad  El ejercicio puede ayudar a disminuir el nivel de azúcar en redmond terell  También puede ayudar a disminuir el riesgo de enfermedad del corazón y Leonardo a perder peso  Los adultos deben hacer ejercicio antonio al menos 150 minutos por semana  Reparta esta cantidad de ejercicio antonio al menos 3 días a la semana  No deje de ejercitar por más de 2 días seguidos   Los niños deben hacer ejercicio antonio al menos 60 minutos la [employer] de los días de la [place]  Ejemplos de ejercicio incluyen caminar o nadar  No se siente por más de 30 minutos  Colabore con mei médico para elaborar un plan de ejercicios  · Brianafurt cantidad de calorías que consume perder peso  Consuma caleb variedad de frutas y verduras, así brielle alimentos integrales con más frecuencia  Escoja productos lácteos, nitin y otras proteínas que daina bajas en grasa  Consuma pocos dulces brielle caramelos, galletas, sodas regulares y bebidas azucaradas  Usted también puede disminuir las calorías comiendo porciones pequeñas  Trabaje junto con mei médico o dietista para desarrollar un plan de alimentación adecuado para usted  · No fume  La nicotina puede dañar los vasos sanguíneos  No use cigarrillos electrónicos o tabaco sin humo en vez de cigarrillos o para tratar de dejar de fumar  Todos estos aún contienen nicotina  Pida a mei médico información si usted fuma actualmente y [place] para dejar de hacerlo  © 2017 [address] [employer] Information is for End User's use only and may not be sold, redistributed or otherwise used for commercial purposes  All illustrations and images included in CareNotes® are the copyrighted property of [employer]  or Earnest Redd  Esta información es sólo para uso en educación  Mei intención no es darle un consejo médico sobre enfermedades o tratamientos  Colsulte con mei Ruby Bourdon farmacéutico antes de seguir cualquier régimen médico para saber si es seguro y efectivo para usted

## 2020-09-13 NOTE — ED ATTENDING ATTESTATION
9/12/2020  I, Liv Ruggiero DO, saw and evaluated the patient  I have discussed the patient with the resident/non-physician practitioner and agree with the resident's/non-physician practitioner's findings, Plan of Care, and MDM as documented in the resident's/non-physician practitioner's note, except where noted  All available labs and Radiology studies were reviewed  I was present for key portions of any procedure(s) performed by the resident/non-physician practitioner and I was immediately available to provide assistance  At this point I agree with the current assessment done in the Emergency Department  I have conducted an independent evaluation of this patient a history and physical is as follows:    ED Course      This is a very pleasant nontoxic 68-year-old gentleman presents the emergency department with a chief complaint of generalized fatigue  Patient reports that he has not been feeling well since 09/08/2020  Was noted in the HPI the patient has been checked multiple times for COVID-19 infection and was subsequently negative (self reported by patient)  Patient's visiting the area from [place] since 09/11/2020  Daughter is present in the room noted that her father had been seen evaluated by the primary care doctor approximately 1 month ago  Unclear whether there was a lab work performed the time but the daughter reports there is no history of renal insufficiency  The purpose of the visit 1 month ago was for a abnormal TB test, follow-up chest x-ray was within normal (self reported by daughter of patient )    Please see physician assistant for specifics regarding HPI  Portions of the record may have been created with voice recognition software  Occasional wrong word or "sound a like" substitutions may have occurred due to the inherent limitations of voice recognition software  Read the chart carefully and recognize, using context, where substitutions have occurred      Critical Care Time  Procedures

## 2020-09-13 NOTE — ASSESSMENT & PLAN NOTE
· POA (creatinine: 2 17) and suspected, baseline is not available at time of admission  · Patient does endorse poor oral intake for the past 2-3 days due to sore throat and generalized malaise  · Current creatinine: 1 09  · UA demonstrates leukocytes, protein, ketones, bilirubin and blood  · Patient should seek out-patient urology evaluation for microscopic hematuria  · Holding home telmisartan and will avoid other nephrotoxins  Resume telmisartan tomorrow  · Consider abdominal ultrasound as an out-patient

## 2020-09-13 NOTE — ASSESSMENT & PLAN NOTE
· Troponin 0 05 at time of admission  The patient denies any chest pain/pressure or other anginal equivalents, and EKG normal sinus rhythm  · Possibly type 2 NSTEMI in the setting of acute renal failure and possible infection    Will however trend troponin and monitor on telemetry overnight

## 2020-09-13 NOTE — H&P
H&P- Donis Duane 1960, 62 y o  male MRN: [id number]    Unit/Bed#: 7T [facility] 705-02 Encounter: [id number]    Primary Care Provider: No primary care provider on file  Date and time admitted to hospital: 9/12/2020 11:16 PM        * Acute renal failure (ARF) ([facility] [address] )  Assessment & Plan  · POA and suspected, baseline is not available at time of admission  · Patient does endorse poor oral intake for the past 2-3 days due to sore throat and generalized malaise  · Received 1L normal saline in ED, will provide Isolyte @ 100cc/hr overnight  · Obtain UA, will measure bladder scan and PVR  · Holding home telmisartan and will avoid other nephrotoxins  · Avoid hypotension  · Will repeat CMP in a m  If no significant improvement will need to consider further testing and possible renal ultrasound, and consider Nephrology consultation    Elevated troponin  Assessment & Plan  · Troponin 0 05 at time of admission  The patient denies any chest pain/pressure or other anginal equivalents, and EKG normal sinus rhythm  · Possibly type 2 NSTEMI in the setting of acute renal failure and possible infection  Will however trend troponin and monitor on telemetry overnight    Acute pharyngitis, unspecified  Assessment & Plan  · Patient reporting 3 day history of sore throat with postnasal drip    · Rapid strep A swab negative, and COVID-19 PCR is pending at this time (will maintain contact/airborne precautions pending this result)  · Possibly viral in etiology, will provide supportive care, and monitor WBC/temperature curve/hemodynamics    Morbid obesity with BMI of 40 0-44 9, adult (HCC)  Assessment & Plan  · Dietary and lifestyle modification discussed    Snoring  Assessment & Plan  · Patient endorsing snoring at night, and daughter reporting apparent periods of apnea while patient sleeps  · Encouraged patient to discuss with PCP, may benefit from polysomnography as outpatient    Transaminitis  Assessment & Plan  · With mild elevations in AST and ALT  Patient denies history of alcohol abuse or known liver disease  · Will obtain chronic hepatitis panel and check iron panel   · Monitor with CMP, and if persists may need to consider imaging    Hyperglycemia  Assessment & Plan  · Glucose 139 on CMP; patient denies any personal history of diabetes  · Will obtain A1c for further screening    Essential hypertension  Assessment & Plan  · On telmisartan 80 mg daily at home, however holding in setting of suspected acute renal failure  · Blood pressure currently acceptable and will continue to monitor as per protocol        VTE Prophylaxis: Heparin  / sequential compression device   Code Status: Level 1 - Full Code  POLST: POLST form is not discussed and not completed at this time  Discussion with family; Daughter present at bedside    Anticipated Length of Stay:  Patient will be admitted on an Observation basis with an anticipated length of stay of  Less than 2 midnights  Justification for Hospital Stay: Please see detailed plans noted above  Chief Complaint:     Fatigue, sore throat  History of Present Illness:  Cezar Sun is a 62 y o  male who reports a past medical history significant only for hypertension  He presents with complaints of fatigue and sore throat  The patient reports he currently lives in [employer], 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, and is currently in the area visiting his daughter  He reports around 09/08/2020 he developed the onset of generalized weakness with fatigue, additionally with sore throat and with patient reporting subsequent poor oral intake due to pain with oral intake; denies any apparent dysphagia  He states he had been taking Tylenol and NyQuil in attempt to alleviate symptoms  Today, he was noted by daughter with significantly increased fatigue, extremely limited oral intake, and frequent napping, which prompted presentation        During ED evaluation, he was found afebrile and remained hemodynamically stable  He was found on initial labs with elevated creatinine to 2 13 with unclear baseline, mild hyponatremia, mild transaminitis, and slight elevation of troponin to 0 05; subsequent EKG was normal sinus rhythm  He was provided IV fluid hydration, is admitted as observation for further evaluation/treatment of presumed acute kidney injury and fatigue  At the time of my evaluation, patient is lying in bed in no acute distress  He reports some persistence of sore throat  He denies any associated fevers/chills, cough/wheezing, change in weight, shortness of breath, chest pain/pressure, abdominal pain/nausea/vomiting/diarrhea, dysuria, or dizziness/lightheadedness  He denies any known sick contacts, and reports he has checked twice a week at work for [phone] with most recent test apparently 09/06/2020 which patient states was negative  Additionally, patient and daughter denies any known history of renal disease in the patient; patient reports aside from while on NyQuil no other use of medications except for home antihypertensive telmisartan, however patient's daughter expresses concern patient may be intermittently noncompliant with this      Review of Systems:    Constitutional:  Denies fever or chills but endorses fatigue and decreased appetite  Eyes:  Denies change in visual acuity   HENT:  Denies nasal congestion but endorses sore throat  Respiratory:  Denies cough or shortness of breath   Cardiovascular:  Denies chest pain or edema   GI:  Denies abdominal pain, nausea, vomiting, bloody stools or diarrhea   :  Denies dysuria   Musculoskeletal:  Denies back pain or joint pain   Integument:  Denies rash   Neurologic:  Denies headache, focal weakness or sensory changes   Endocrine:  Denies polyuria or polydipsia   Lymphatic:  Denies swollen glands   Psychiatric:  Denies depression or anxiety     Past Medical and Surgical History:   Past Medical History:   Diagnosis Date    Hypertension      History reviewed  No pertinent surgical history  Meds/Allergies:  Medications Prior to Admission   Medication    telmisartan (MICARDIS) 80 MG tablet       Allergies: No Known Allergies  History:  Marital Status: Single     Substance Use History:   Social History     Substance and Sexual Activity   Alcohol Use Never    Frequency: Never     Social History     Tobacco Use   Smoking Status Never Smoker   Smokeless Tobacco Never Used     Social History     Substance and Sexual Activity   Drug Use Never       Family History:  Family History   Problem Relation Age of Onset    Hypertension Mother        Physical Exam:     Vitals:   Blood Pressure: 129/77 (09/13/20 0136)  Pulse: 81 (09/13/20 0136)  Temperature: 99 5 °F (37 5 °C) (09/13/20 0136)  Temp Source: Temporal (09/13/20 0136)  Respirations: 20 (09/13/20 0136)  Height: 5' 9" (175 3 cm) (09/13/20 0136)  Weight - Scale: 129 kg (283 lb 15 2 oz) (09/13/20 0136)  SpO2: 97 % (09/13/20 0136)    Constitutional:  Well developed, morbidly obese, no acute distress, non-toxic appearance   Eyes:  PERRL, conjunctiva normal   HENT:  Atraumatic, external ears normal, nose normal, oropharynx moist, no pharyngeal exudates but mild erythema noted  Neck- normal range of motion, no tenderness, supple   Respiratory:  No respiratory distress, normal breath sounds, no rales, no wheezing   Cardiovascular:  Normal rate, normal rhythm, no murmurs, no gallops, no rubs   GI:  Soft, nondistended, normal bowel sounds, nontender, no organomegaly, no mass, no rebound, no guarding   :  No costovertebral angle tenderness   Musculoskeletal:  No edema, no tenderness, no deformities   Back- no tenderness  Integument:  Well hydrated, no rash   Lymphatic:  No lymphadenopathy noted   Neurologic:  Alert &awake, communicative, CN 2-12 normal, normal motor function, normal sensory function, no focal deficits noted   Psychiatric:  Speech and behavior appropriate       Lab Results: I have personally reviewed pertinent reports  Results from last 7 days   Lab Units 09/12/20  2346   WBC Thousand/uL 9 00   HEMOGLOBIN g/dL 12 6*   HEMATOCRIT % 37 6*   PLATELETS Thousands/uL 216   NEUTROS PCT % 77*   LYMPHS PCT % 13*   MONOS PCT % 9   EOS PCT % 0     Results from last 7 days   Lab Units 09/12/20  2346   POTASSIUM mmol/L 4 0   CHLORIDE mmol/L 94*   CO2 mmol/L 33*   BUN mg/dL 30*   CREATININE mg/dL 2 17*   CALCIUM mg/dL 8 8   ALK PHOS U/L 54   ALT U/L 58*   AST U/L 88*           EKG:  Normal sinus rhythm    Imaging: I have personally reviewed pertinent films in PACS    CXR:  Personally reviewed, no acute cardiopulmonary disease visualized  Final radiologist read is pending  ** Please Note: Dragon 360 Dictation voice to text software was used in the creation of this document   **

## 2020-09-13 NOTE — ASSESSMENT & PLAN NOTE
Admission 10/8/18 for wound treatment IV antibiotics   · On telmisartan 80 mg daily at home, however holding in setting of suspected acute renal failure  · Blood pressures are elevated but will restart telmisartan tomorrow

## 2020-09-14 NOTE — UTILIZATION REVIEW
Notification of Observation Admission/Observation Authorization Request   This is a Notification of Observation Admission for [address]  Be advised that this patient was admitted to our facility under Observation Status  Contact Suzette Ruano at [phone] for additional admission information  [address] [facility]   DEPT DEDICATED DONNA [phone]  Patient Name:   Anuja Moya   YOB: 1960       [address]   [address]:   Katey Oropeza  Tax ID: [id number]  NPI: [id number] Attending Provider/NPI:  Phone:  Address: [place], [place] [[id number]]  [phone]  Same as KEMAL/Silvana Tena [address]6 of Service Code: 25 Place of Service Name:  CPT Code for Observation:  On [facility]  CPT  / CPT 48462   Start Date: 09/13/20 12:52 AM Discharge Date & Time: 9/13/2020  3:23 PM    Type of Admission: Observation Status Discharge Disposition (if discharged): Home/Self Care   Patient Diagnoses: Dehydration [E86 0]  Hyponatremia [E87 1]  Fatigue [R53 83]  Anemia [D64 9]  Elevated troponin [R79 89]   Orders: Admission Orders (From admission, onward)     Ordered        09/13/20 0052  Place in Observation  Once                 Assigned Utilization Review Contact: Suzette Ruano  Utilization   Network Utilization Review Department  Phone: [phone]; Fax [phone]  Email: [email or web address]   ATTENTION PAYERS: Please call the assigned Utilization  directly with any questions or concerns ALL voicemails in the department are confidential  Send all requests for admission clinical reviews, approved or denied determinations and any other requests to dedicated fax number belonging to the campus where the patient is receiving treatment      Initial Clinical Review     Admission: Date/Time/Statement:       Admission Orders (From admission, onward)              Ordered   09/13/20 0052   Place in Observation  Once                            Orders Placed This Encounter   Procedures    Place in Observation       Standing Status:   Standing       Number of Occurrences:   1       Order Specific Question:   Admitting Physician       Answer:   Mauricio aDigle [[id number]]       Order Specific Question:   Level of Care       Answer:   Med Surg [16]       Order Specific Question:   Bed request comments       Answer:   tele               ED Arrival Information      Expected Arrival Acuity Means of Arrival Escorted By Service Admission Type     - 9/12/2020 23:11 Urgent Walk-In Self General Medicine Urgent     Arrival Complaint      weakness               Chief Complaint   Patient presents with    Fatigue       pt states that he hasnt been feeling well since tuesday  pt states that he get checked twice a week at work for [employer] and has been negitive  pt states that he was last tested on sunday  pt states that he came here on friday [address]  pt states that he has been feeling tired, weak and having a sore throat and  SOB  pt denies any fevers, bodyaches, chest pain or HA  pt states that he took tylenol and some type of flue med around 6pm tonight      Assessment/Plan:   Mr Will Valencia is a 61 yo male who presents to the ED from home with c/o fatigue and sore throat with decreased oral intake and frequent napping  He has been feeling unwell since 9/8  He has been negative when checked for Covid  PMH: HTN, obesity, snoring,   In the ED he is afebrile and hemodynamically stable but has elevated creat 2 13, mild hyponatremia, transaminitis and troponin elevation of 0 05 with ECG NSR  He is treated with IV fluids  He is admitted to OBSERVATION status with ARF - IV fluids after bolus in ED, avoid nephrotoxins, check UA, bladder scan with PVR, follow labs with possible renal US and Nephrology consult  Elevated Troponin - possible type 2 NSTEMI r/t ARF - trend troponins, tele    Acute Pharyngitis - rapid strep negative  Transaminitis  - check hepatitis and iron panels  Hyperglycemia - glucose 139 - check A1C        9/13 will need to consider CT since contrast cannot be used r/t renal function  Na 131 will change fluids to NSS  Negative Covid  Sore throat resolved    Feel lightheaded and is hungry                  ED Triage Vitals   Temperature Pulse Respirations Blood Pressure SpO2   09/12/20 2321 09/12/20 2321 09/12/20 2321 09/12/20 2321 09/12/20 2321   99 1 °F (37 3 °C) 92 18 151/70 94 %       Temp Source Heart Rate Source Patient Position - Orthostatic VS BP Location FiO2 (%)   09/12/20 2321 09/12/20 2321 09/13/20 0735 09/12/20 2321 --   Tympanic Monitor Lying Left arm         Pain Score           09/13/20 0136           No Pain                  Wt Readings from Last 1 Encounters:   09/13/20 129 kg (283 lb 15 2 oz)     Additional Vital Signs:   09/13/20 0735    98 9 °F (37 2 °C)    86    20    165/90    99 %    None (Room air)    Lying    09/13/20 0136    99 5 °F (37 5 °C)    81    20    129/77    97 %    None (Room air)           Pertinent Labs/Diagnostic Test Results:      9/13 CXR - no acute disease     9/12 ECG - NSR          Results from last 7 days   Lab Units 09/12/20  2346   SARS-COV-2   Negative           Results from last 7 days   Lab Units 09/13/20  0520 09/12/20  2346   WBC Thousand/uL 7 30 9 00   HEMOGLOBIN g/dL 12 1* 12 6*   HEMATOCRIT % 36 6* 37 6*   PLATELETS Thousands/uL 196 216   NEUTROS ABS Thousands/µL  --  7 00         Results from last 7 days   Lab Units 09/13/20  0520 09/12/20  2346   SODIUM mmol/L 131* 134*   POTASSIUM mmol/L 3 7 4 0   CHLORIDE mmol/L 97 94*   CO2 mmol/L 28 33*   ANION GAP mmol/L 6 7   BUN mg/dL 30* 30*   CREATININE mg/dL 1 45 2 17*   EGFR ml/min/1 73sq m 52* 32*   CALCIUM mg/dL 8 3* 8 8   MAGNESIUM mg/dL 2 2  --            Results from last 7 days   Lab Units 09/13/20  0520 09/12/20  2346   AST U/L 89* 88*   ALT U/L 63* 58*   ALK PHOS U/L 52 54 TOTAL PROTEIN g/dL 6 7 7 3   ALBUMIN g/dL 3 2 3 6   TOTAL BILIRUBIN mg/dL 0 40 0 40           Results from last 7 days   Lab Units 09/13/20  0520 09/12/20  2346   GLUCOSE RANDOM mg/dL 120* 139*          Results from last 7 days   Lab Units 09/13/20  0520   CK TOTAL U/L 803*   CK MB INDEX % <1 0   CK MB ng/mL 6 5*            Results from last 7 days   Lab Units 09/13/20  0520 09/13/20  0222 09/12/20  2346   TROPONIN I ng/mL 0 03 0 04* 0 05*          Results from last 7 days   Lab Units 09/13/20  0520   TSH 3RD GENERATON uIU/mL 0 471              Results from last 7 days   Lab Units 09/12/20  2346   LIPASE u/L 124     Results from last 7 days   Lab Units 09/13/20  0238   CLARITY UA   Cloudy*   COLOR UA   Brown*   SPEC GRAV UA   1 020   PH UA   5 0   GLUCOSE UA mg/dl Negative   KETONES UA mg/dl 5 (Trace)*   BLOOD UA   250 0*   PROTEIN UA mg/dl 100 (2+)*   NITRITE UA   Negative   BILIRUBIN UA   1 mg/dL*   UROBILINOGEN UA mg/dL 1 0   LEUKOCYTES UA   25 0*   WBC UA /hpf 2-4*   RBC UA /hpf 4-10*   BACTERIA UA /hpf Innumerable*   EPITHELIAL CELLS WET PREP /hpf None Seen     ED Treatment:           Medication Administration from 09/12/2020 2311 to 09/13/2020 0127    Date/Time Order Dose Route Action   09/12/2020 2350 sodium chloride 0 9 % bolus 1,000 mL 1,000 mL Intravenous New Bag   09/13/2020 0024 aspirin chewable tablet 324 mg 324 mg Oral Given          Medical History        Past Medical History:   Diagnosis Date    Hypertension          Present on Admission:   Acute renal failure (ARF) (HCC)   (Resolved) Elevated troponin   Essential hypertension   Hyperglycemia   Transaminitis   Acute pharyngitis, unspecified   Snoring   Microscopic hematuria    Admitting Diagnosis: Dehydration [E86 0]  Hyponatremia [E87 1]  Fatigue [R53 83]  Anemia [D64 9]  Elevated troponin [R79 89]      Age/Sex: 62 y o  male      Admission Orders:     Scheduled Medications:  heparin (porcine), 5,000 Units, Subcutaneous, Q8H [address]       Continuous IV Infusions:  sodium chloride, 125 mL/hr, Intravenous, Continuous       PRN Meds:  acetaminophen, 650 mg, Oral, Q6H PRN  ondansetron, 4 mg, Intravenous, Q6H PRN     SCDs  OOB as joey   Chronic hepatitis panel   Iron panel  Cardiac diet      Network Utilization Review Department  [email or web address]  ATTENTION: Please call with any questions or concerns to [phone] and carefully listen to the prompts so that you are directed to the right person   All voicemails are confidential